# Patient Record
Sex: FEMALE | Race: BLACK OR AFRICAN AMERICAN | URBAN - METROPOLITAN AREA
[De-identification: names, ages, dates, MRNs, and addresses within clinical notes are randomized per-mention and may not be internally consistent; named-entity substitution may affect disease eponyms.]

---

## 2021-09-30 ENCOUNTER — OFFICE VISIT (OUTPATIENT)
Dept: FAMILY MEDICINE | Facility: CLINIC | Age: 26
End: 2021-09-30
Payer: MEDICAID

## 2021-09-30 VITALS
OXYGEN SATURATION: 100 % | HEART RATE: 66 BPM | WEIGHT: 127.4 LBS | DIASTOLIC BLOOD PRESSURE: 71 MMHG | SYSTOLIC BLOOD PRESSURE: 109 MMHG | RESPIRATION RATE: 16 BRPM | TEMPERATURE: 98.2 F | HEIGHT: 63 IN | BODY MASS INDEX: 22.57 KG/M2

## 2021-09-30 DIAGNOSIS — Z23 HIGH PRIORITY FOR 2019-NCOV VACCINE: ICD-10-CM

## 2021-09-30 DIAGNOSIS — Z02.89 REFUGEE HEALTH EXAMINATION: Primary | ICD-10-CM

## 2021-09-30 LAB
ALBUMIN SERPL-MCNC: 3.5 G/DL (ref 3.5–5)
ALP SERPL-CCNC: 47 U/L (ref 45–120)
ALT SERPL W P-5'-P-CCNC: 10 U/L (ref 0–45)
ANION GAP SERPL CALCULATED.3IONS-SCNC: 10 MMOL/L (ref 5–18)
AST SERPL W P-5'-P-CCNC: 17 U/L (ref 0–40)
BILIRUB SERPL-MCNC: 1.9 MG/DL (ref 0–1)
BUN SERPL-MCNC: 10 MG/DL (ref 8–22)
CALCIUM SERPL-MCNC: 9.2 MG/DL (ref 8.5–10.5)
CHLORIDE BLD-SCNC: 106 MMOL/L (ref 98–107)
CHOLEST SERPL-MCNC: 159 MG/DL
CO2 SERPL-SCNC: 23 MMOL/L (ref 22–31)
CREAT SERPL-MCNC: 0.7 MG/DL (ref 0.6–1.1)
FASTING STATUS PATIENT QL REPORTED: NORMAL
GFR SERPL CREATININE-BSD FRML MDRD: >90 ML/MIN/1.73M2
GLUCOSE BLD-MCNC: 82 MG/DL (ref 70–125)
HDLC SERPL-MCNC: 61 MG/DL
HIV 1+2 AB+HIV1 P24 AG SERPL QL IA: NEGATIVE
LDLC SERPL CALC-MCNC: 87 MG/DL
POTASSIUM BLD-SCNC: 4.1 MMOL/L (ref 3.5–5)
PROT SERPL-MCNC: 6.6 G/DL (ref 6–8)
SODIUM SERPL-SCNC: 139 MMOL/L (ref 136–145)
TRIGL SERPL-MCNC: 56 MG/DL

## 2021-09-30 PROCEDURE — 87491 CHLMYD TRACH DNA AMP PROBE: CPT | Performed by: STUDENT IN AN ORGANIZED HEALTH CARE EDUCATION/TRAINING PROGRAM

## 2021-09-30 PROCEDURE — 86787 VARICELLA-ZOSTER ANTIBODY: CPT | Performed by: STUDENT IN AN ORGANIZED HEALTH CARE EDUCATION/TRAINING PROGRAM

## 2021-09-30 PROCEDURE — 91301 COVID-19,PF,MODERNA (18+ YRS): CPT | Performed by: STUDENT IN AN ORGANIZED HEALTH CARE EDUCATION/TRAINING PROGRAM

## 2021-09-30 PROCEDURE — 87389 HIV-1 AG W/HIV-1&-2 AB AG IA: CPT | Performed by: STUDENT IN AN ORGANIZED HEALTH CARE EDUCATION/TRAINING PROGRAM

## 2021-09-30 PROCEDURE — 86708 HEPATITIS A ANTIBODY: CPT | Performed by: STUDENT IN AN ORGANIZED HEALTH CARE EDUCATION/TRAINING PROGRAM

## 2021-09-30 PROCEDURE — 86706 HEP B SURFACE ANTIBODY: CPT | Performed by: STUDENT IN AN ORGANIZED HEALTH CARE EDUCATION/TRAINING PROGRAM

## 2021-09-30 PROCEDURE — 99385 PREV VISIT NEW AGE 18-39: CPT | Mod: 25 | Performed by: STUDENT IN AN ORGANIZED HEALTH CARE EDUCATION/TRAINING PROGRAM

## 2021-09-30 PROCEDURE — 80053 COMPREHEN METABOLIC PANEL: CPT | Performed by: STUDENT IN AN ORGANIZED HEALTH CARE EDUCATION/TRAINING PROGRAM

## 2021-09-30 PROCEDURE — 0011A COVID-19,PF,MODERNA (18+ YRS): CPT | Performed by: STUDENT IN AN ORGANIZED HEALTH CARE EDUCATION/TRAINING PROGRAM

## 2021-09-30 PROCEDURE — 87340 HEPATITIS B SURFACE AG IA: CPT | Performed by: STUDENT IN AN ORGANIZED HEALTH CARE EDUCATION/TRAINING PROGRAM

## 2021-09-30 PROCEDURE — 86481 TB AG RESPONSE T-CELL SUSP: CPT | Performed by: STUDENT IN AN ORGANIZED HEALTH CARE EDUCATION/TRAINING PROGRAM

## 2021-09-30 PROCEDURE — 99000 SPECIMEN HANDLING OFFICE-LAB: CPT | Performed by: STUDENT IN AN ORGANIZED HEALTH CARE EDUCATION/TRAINING PROGRAM

## 2021-09-30 PROCEDURE — 86682 HELMINTH ANTIBODY: CPT | Mod: 90 | Performed by: STUDENT IN AN ORGANIZED HEALTH CARE EDUCATION/TRAINING PROGRAM

## 2021-09-30 PROCEDURE — 87591 N.GONORRHOEAE DNA AMP PROB: CPT | Performed by: STUDENT IN AN ORGANIZED HEALTH CARE EDUCATION/TRAINING PROGRAM

## 2021-09-30 PROCEDURE — 80061 LIPID PANEL: CPT | Performed by: STUDENT IN AN ORGANIZED HEALTH CARE EDUCATION/TRAINING PROGRAM

## 2021-09-30 PROCEDURE — 86803 HEPATITIS C AB TEST: CPT | Performed by: STUDENT IN AN ORGANIZED HEALTH CARE EDUCATION/TRAINING PROGRAM

## 2021-09-30 PROCEDURE — 36415 COLL VENOUS BLD VENIPUNCTURE: CPT | Performed by: STUDENT IN AN ORGANIZED HEALTH CARE EDUCATION/TRAINING PROGRAM

## 2021-09-30 PROCEDURE — 86780 TREPONEMA PALLIDUM: CPT | Performed by: STUDENT IN AN ORGANIZED HEALTH CARE EDUCATION/TRAINING PROGRAM

## 2021-09-30 PROCEDURE — 86704 HEP B CORE ANTIBODY TOTAL: CPT | Performed by: STUDENT IN AN ORGANIZED HEALTH CARE EDUCATION/TRAINING PROGRAM

## 2021-09-30 ASSESSMENT — MIFFLIN-ST. JEOR: SCORE: 1283.75

## 2021-09-30 NOTE — PROGRESS NOTES
Initial Refugee Screening Exam    PC staff should enter immunizations into the chart      used this visit: A [employer]  was used for this visit.     HEALTH HISTORY    Concerns today: none    Country of Origin:  [place]  Year left country of Origin: 2021  Other countries lived in and dates: [place] 2021  Date of Arrival in [place]: 8/5/2021  Is our listed age correct? Yes  Do you go by any other name?: No  Native Language: Cong  Family in [place]: Yes her mom and dad, aunt, 7 brothers, one sister (18 years old). She lives with 3 brothers.  Family in other countries:  Aunt in [place]    Pre-Departure Medical Screening Examination Reviewed:Yes  Class A conditions: No  Class B conditions: No  Presumptive treatment for intestinal parasites?: Yes - praziquantel 8/1/21, albendazole 8/3/21, artemether-lumefantrine 8/1-8/3/21  History of BCG vaccination: Unknown  Chronic or serious illness: No  Hospitalizations: No  Trauma: No    Family history, medication list, problem list and allergies were reviewed and updated as needed in Epic.    ROS:    C: NEGATIVE for fever, chills, change in weight  I: NEGATIVE for worrisome rashes, moles or lesions, spots without sensations (e.g. leprosy)  E: NEGATIVE for vision changes or irritation or red eyes  E/M: NEGATIVE for ear, mouth and throat problems. Does get some bleeding when she brushes her teeth  R: NEGATIVE for significant cough or SOB  CV: NEGATIVE for chest pain, palpitations or peripheral edema  GI: NEGATIVE for nausea, abdominal pain, heartburn, or change in bowel habits  : NEGATIVE for frequency, dysuria, or hematuria  M: NEGATIVE for significant arthralgias or myalgia  N: NEGATIVE for weakness, dizziness or paresthesias, headaches  E: NEGATIVE for temperature intolerance, skin/hair changes  H: NEGATIVE for bleeding problems  P: NEGATIVE for nightmares, no sleep problems, not easily saddened or angered    Mental Health:    1. In the past month, have you  "had many bad dreams or nightmares that remind you of   things that happened in your country or refugee camp? No  2. In the past month, have you felt very sad? No  3. In the past month, have you been thinking too much about the past (even if you did   not want to?) No  4. In the past month, have you avoided situations that remind you of the past? No  (Prompt: Do you turn off the radio or TV if the program is disturbing?)   5. Do any of these problems make it difficult to do what you need to do on a daily   basis?  (Prompt: Are you able to take care of yourself and your family?) No      EXAMINATION:  /71 (BP Location: Left arm, Patient Position: Sitting, Cuff Size: Adult Regular)   Pulse 66   Temp 98.2  F (36.8  C) (Oral)   Resp 16   Ht 1.595 m (5' 2.8\")   Wt 57.8 kg (127 lb 6.4 oz)   LMP 09/16/2021 (Exact Date)   SpO2 100%   Breastfeeding No   BMI 22.72 kg/m    GENERAL: healthy, alert, well nourished, well hydrated, no distress  HENT: ear canals- normal; TMs- normal; Nose- normal; Mouth- no ulcers, no lesions  NECK: no tenderness, no adenopathy, no asymmetry, no masses, no stiffness; thyroid- normal to palpation  RESP: lungs clear to auscultation - no rales, no rhonchi, no wheezes  CV: regular rates and rhythm, normal S1 S2, no S3 or S4 and no murmur, no click or rub -  ABDOMEN: soft, no tenderness, no  hepatosplenomegaly, no masses, normal bowel sounds    ASSESSMENT:    New Arrival Health Screening     PLAN:    1) Labs:    CMP  Lipid if age >18  CBC with diff  TB Quant if age>5  RPR  Strongyloides Ab  Schistosoma Ab  HIV  Heb B Core Ab  Hep B Surface Ab  Hep B Surface Ag  Hep C Ab  Hep A Ab  Varicella titer  Urine for GC/Chlamydia if pt of sexually active age  Urine Pregnancy Test if female of childbearing age       2) TB:    PPD if 6 months to under 2 years old, TB Quant if over 2 years old      3) Immunizations to be applied at second visit. Received first dose of COVID-19 vaccine today.       Total " of 40 minutes was spent in face to face contact with patient with > 50% in counseling and coordination of care.  Options for treatment and/or follow-up care were reviewed with the patient. Vane Serrano was engaged and actively involved in the decision making process. She verbalized understanding of the options discussed and was satisfied with the final plan.    RTC in 2-3 weeks for discussion of results, treatment (if necessary) and  devlopment of an ongoing plan for care    Nya Guevara MD, PGY-2  I precepted today with Dr. Sushila MD.

## 2021-09-30 NOTE — PROGRESS NOTES
Preceptor Attestation:   Patient seen, evaluated and discussed with the resident. I have verified the content of the note, which accurately reflects my assessment of the patient and the plan of care.   Supervising Physician:  Robin Conti MD

## 2021-09-30 NOTE — PATIENT INSTRUCTIONS
Plan:  1. It was a pleasure seeing you in clinic today.  2. We will discuss your lab results at your next visit.   3. I will see you at your appointment on 10/25/21.  4. You received your first dose of COVID vaccine today. You will get your second dose at your next visit.     [facility]  Phone: [phone]  Address: [address] [place] [address]

## 2021-10-01 LAB
C TRACH DNA SPEC QL NAA+PROBE: NEGATIVE
HAV IGG SER QL IA: POSITIVE
HBV CORE AB SERPL QL IA: NEGATIVE
HBV SURFACE AB SERPLBLD QL IA.RAPID: NEGATIVE
HBV SURFACE AG SERPL QL IA: NONREACTIVE
HCV AB SERPL QL IA: NEGATIVE
N GONORRHOEA DNA SPEC QL NAA+PROBE: NEGATIVE
T PALLIDUM AB SER QL: NEGATIVE
VZV IGG SER QL IA: NEGATIVE

## 2021-10-03 LAB — STRONGYLOIDES IGG SER IA-ACNC: 2 IV

## 2021-10-04 LAB
GAMMA INTERFERON BACKGROUND BLD IA-ACNC: 0.08 IU/ML
M TB IFN-G BLD-IMP: NEGATIVE
M TB IFN-G CD4+ BCKGRND COR BLD-ACNC: 9.92 IU/ML
MITOGEN IGNF BCKGRD COR BLD-ACNC: -0.02 IU/ML
MITOGEN IGNF BCKGRD COR BLD-ACNC: 0 IU/ML
QUANTIFERON MITOGEN: 10 IU/ML
QUANTIFERON NIL TUBE: 0.08 IU/ML
QUANTIFERON TB1 TUBE: 0.06 IU/ML
QUANTIFERON TB2 TUBE: 0.08

## 2021-10-05 LAB — SCHISTOSOMA IGG SER QL: NEGATIVE

## 2021-10-25 ENCOUNTER — OFFICE VISIT (OUTPATIENT)
Dept: FAMILY MEDICINE | Facility: CLINIC | Age: 26
End: 2021-10-25
Payer: COMMERCIAL

## 2021-10-25 VITALS
WEIGHT: 127.2 LBS | SYSTOLIC BLOOD PRESSURE: 113 MMHG | RESPIRATION RATE: 16 BRPM | TEMPERATURE: 97.5 F | DIASTOLIC BLOOD PRESSURE: 77 MMHG | BODY MASS INDEX: 22.68 KG/M2 | OXYGEN SATURATION: 100 % | HEART RATE: 66 BPM

## 2021-10-25 DIAGNOSIS — Z23 NEED FOR VACCINATION: ICD-10-CM

## 2021-10-25 DIAGNOSIS — Z02.89 REFUGEE HEALTH EXAMINATION: Primary | ICD-10-CM

## 2021-10-25 DIAGNOSIS — Z23 HIGH PRIORITY FOR 2019-NCOV VACCINE: ICD-10-CM

## 2021-10-25 PROCEDURE — 90746 HEPB VACCINE 3 DOSE ADULT IM: CPT | Performed by: STUDENT IN AN ORGANIZED HEALTH CARE EDUCATION/TRAINING PROGRAM

## 2021-10-25 PROCEDURE — 0012A COVID-19,PF,MODERNA (18+ YRS): CPT | Performed by: STUDENT IN AN ORGANIZED HEALTH CARE EDUCATION/TRAINING PROGRAM

## 2021-10-25 PROCEDURE — 90471 IMMUNIZATION ADMIN: CPT | Performed by: STUDENT IN AN ORGANIZED HEALTH CARE EDUCATION/TRAINING PROGRAM

## 2021-10-25 PROCEDURE — 90472 IMMUNIZATION ADMIN EACH ADD: CPT | Performed by: STUDENT IN AN ORGANIZED HEALTH CARE EDUCATION/TRAINING PROGRAM

## 2021-10-25 PROCEDURE — 99213 OFFICE O/P EST LOW 20 MIN: CPT | Mod: 25 | Performed by: STUDENT IN AN ORGANIZED HEALTH CARE EDUCATION/TRAINING PROGRAM

## 2021-10-25 PROCEDURE — 91301 COVID-19,PF,MODERNA (18+ YRS): CPT | Performed by: STUDENT IN AN ORGANIZED HEALTH CARE EDUCATION/TRAINING PROGRAM

## 2021-10-25 PROCEDURE — 90716 VAR VACCINE LIVE SUBQ: CPT | Performed by: STUDENT IN AN ORGANIZED HEALTH CARE EDUCATION/TRAINING PROGRAM

## 2021-10-25 NOTE — NURSING NOTE
Due to patient being non-English speaking/uses sign language, an  was used for this visit. Only for face-to-face interpretation by an external agency, date and length of interpretation can be found on the scanned worksheet.       name: Ricardo [id number]  Language: Cong   Agency:  [employer]  Type of interpretation:  TELEPHONE      JOVANA Souza  3:33 PM  10/25/2021

## 2021-10-25 NOTE — LETTER
RETURN TO WORK/SCHOOL FORM    10/25/2021    Re: Vane Serrano  1995      To Whom It May Concern:     Vane Serrano was seen in clinic today.  She may return to work without restrictions on 10/26/21.           Nya Guevara MD  10/25/2021 4:22 PM

## 2021-10-25 NOTE — PATIENT INSTRUCTIONS
Plan:  1. It was a pleasure seeing you in clinic today.  2. You received vaccines for chicken pox and hepatitis B today.  3. Come back in 2 weeks for the second COVID vaccine  4. Come back to see Dr. Guevara in one month.     [facility]  Phone: [phone]  Address: [address] [place] [address]

## 2021-10-25 NOTE — PROGRESS NOTES
Preceptor Attestation:    I discussed the patient with the resident and evaluated the patient in person. I have verified the content of the note, which accurately reflects my assessment of the patient and the plan of care.   Supervising Physician:  Seferino Paige MD.

## 2021-10-25 NOTE — PROGRESS NOTES
REFUGEE SCREENING: SECOND VISIT    Subjective: Doing well    Labs from Initial Refugee Screening Visit were reviewed       Office Visit on 09/30/2021   Component Date Value Ref Range Status     Neisseria gonorrhoeae 09/30/2021 Negative  Negative Final    Negative for N. gonorrhoeae rRNA by transcription mediated amplification. A negative result by transcription mediated amplification does not preclude the presence of C. trachomatis infection because results are dependent on proper and adequate collection, absence of inhibitors and sufficient rRNA to be detected.     Chlamydia trachomatis 09/30/2021 Negative  Negative Final    A negative result by transcription mediated amplification does not preclude the presence of C. trachomatis infection because results are dependent on proper and adequate collection, absence of inhibitors and sufficient rRNA to be detected.     Sodium 09/30/2021 139  136 - 145 mmol/L Final     Potassium 09/30/2021 4.1  3.5 - 5.0 mmol/L Final     Chloride 09/30/2021 106  98 - 107 mmol/L Final     Carbon Dioxide (CO2) 09/30/2021 23  22 - 31 mmol/L Final     Anion Gap 09/30/2021 10  5 - 18 mmol/L Final     Urea Nitrogen 09/30/2021 10  8 - 22 mg/dL Final     Creatinine 09/30/2021 0.70  0.60 - 1.10 mg/dL Final     Calcium 09/30/2021 9.2  8.5 - 10.5 mg/dL Final     Glucose 09/30/2021 82  70 - 125 mg/dL Final     Alkaline Phosphatase 09/30/2021 47  45 - 120 U/L Final     AST 09/30/2021 17  0 - 40 U/L Final     ALT 09/30/2021 10  0 - 45 U/L Final     Protein Total 09/30/2021 6.6  6.0 - 8.0 g/dL Final     Albumin 09/30/2021 3.5  3.5 - 5.0 g/dL Final     Bilirubin Total 09/30/2021 1.9* 0.0 - 1.0 mg/dL Final     GFR Estimate 09/30/2021 >90  >60 mL/min/1.73m2 Final    As of July 11, 2021, eGFR is calculated by the CKD-EPI creatinine equation, without race adjustment. eGFR can be influenced by muscle mass, exercise, and diet. The reported eGFR is an estimation only and is only applicable if the renal  function is stable.     Cholesterol 09/30/2021 159  <=199 mg/dL Final     Triglycerides 09/30/2021 56  <=149 mg/dL Final     Direct Measure HDL 09/30/2021 61  >=50 mg/dL Final    HDL Cholesterol Reference Range:     0-2 years:   No reference ranges established for patients under 2 years old  at [facility] for lipid analytes.    2-8 years:  Greater than 45 mg/dL     18 years and older:   Female: Greater than or equal to 50 mg/dL   Male:   Greater than or equal to 40 mg/dL     LDL Cholesterol Calculated 09/30/2021 87  <=129 mg/dL Final     Patient Fasting > 8hrs? 09/30/2021 Unknown   Final     Treponema Antibody Total 09/30/2021 Negative  Negative Final     Strongyloides Vicki IgG 09/30/2021 2.0* <=0.9 IV Final    INTERPRETIVE INFORMATION: Strongyloides Ab, IgG by CULLEN      0.9 IV or less....... Negative - No significant                          level of Strongyloides IgG                          antibody detected.       1.0 IV................Equivocal - The Strongyloides IgG                           antibody result is borderline and                           therefore inconclusive. Recommend                           retesting the patient in 2-4 weeks,                          if clinically indicated.      1.1 IV or greater ... Positive - IgG antibodies to                          Strongyloides detected, which                          may suggest current or past                          infection.    False-positive results may occur with prior exposure to   other helminth infections. Testing low-prevalence   populations may also result in false-positive results.     Schistosoma Ab IgG 09/30/2021 Negative  Negative Final     HIV Antigen Antibody Combo 09/30/2021 Negative  Negative Final     Hepatitis A Antibody IgG 09/30/2021 Positive* Negative Final     Hepatitis B Core Antibody Total 09/30/2021 Negative  Negative Final     Hepatitis B Surface Antibody 09/30/2021 Negative  Negative Final     Hepatitis C  Antibody 09/30/2021 Negative  Negative Final     Varicella Zoster Antibody IgG 09/30/2021 Negative   Final     Hepatitis B Surface Antigen 09/30/2021 Nonreactive  Nonreactive Final     Quantiferon Nil Tube 09/30/2021 0.08  IU/mL Final     Quantiferon TB1 Tube 09/30/2021 0.06  IU/mL Final     Quantiferon TB2 Tube 09/30/2021 0.08   Final     Quantiferon Mitogen 09/30/2021 10.00  IU/mL Final     Quantiferon-TB Gold Plus 09/30/2021 Negative  Negative Final    No interferon gamma response to M.tuberculosis antigens was detected. Infection with M.tuberculosis is unlikely, however a single negative result does not exclude infection. In patients at high risk for infection, a second test should be considered in accordance with the 2017 ATS/IDSA/CDC Clinical Pract  ice Guidelines for Diagnosis of Tuberculosis in Adults and Children      TB1 Ag minus Nil Value 09/30/2021 -0.02  IU/mL Final     TB2 Ag minus Nil Value 09/30/2021 0.00  IU/mL Final     Mitogen minus Nil Result 09/30/2021 9.92  IU/mL Final     Nil Result 09/30/2021 0.08  IU/mL Final        ROS:  General: No fevers, sleeping well at night  Head: No headache  Neck: No swallowing problems   CV: No chest pain or palpitations  Resp: No shortness of breath.  No cough.  GI: No constipation, or diarrhea, no nausea or vomiting  : No urinary c/o    Objective:  /77 (BP Location: Right arm, Patient Position: Sitting, Cuff Size: Adult Regular)   Pulse 66   Temp 97.5  F (36.4  C) (Oral)   Resp 16   Wt 57.7 kg (127 lb 3.2 oz)   SpO2 100%   BMI 22.68 kg/m    Gen:  Well nourished and in NAD  HEENT: nasopharynx pink and moist; oropharynx pink and moist  Neck: supple without lymphadenopathy  CV:  RRR  - no murmurs, rubs, or gallups,   Pulm:  CTAB, no wheezes/rales/rhonchi, good air entry   ABD: soft, nontender  Extrem: no cyanosis, edema or clubbing;   Psych: Euthymic     Assessment/Plan:  Refugee health examination    1)Abnormal Lab Results:  Hep A antibody positive  indicating immunity   Bili 1.9 without other elevations in LFTs  Negative hep B surface antibody- Needs hep B series  Strongyloides antibody positive, but was treated prophylactically with praziquantel 8/1/21 and albendazole 8/3/21  Varicella aicha negative- needs varicella vaccine    2) TB:   TB Quant result: negative 9/30/21    3)Immunizations:  Hep B  Varicella  2nd Moderna COVID    4)Referrals:  No     5)Follow up Plan:   Return to clinic for follow up in 4 weeks    We discussed having a visit with a dentist to establish regular dental care: no  We discussed yearly visits with a primary care physician for preventative health care: yes      Diagnosis or treatment significantly limited by social determinants of health - limited income, low health literacy, language barrier    I precepted today with Dr. Paige.     Nya Guevara MD, PGY-2

## 2021-11-29 ENCOUNTER — OFFICE VISIT (OUTPATIENT)
Dept: FAMILY MEDICINE | Facility: CLINIC | Age: 26
End: 2021-11-29
Payer: COMMERCIAL

## 2021-11-29 VITALS
TEMPERATURE: 96.6 F | HEART RATE: 59 BPM | WEIGHT: 125.4 LBS | SYSTOLIC BLOOD PRESSURE: 113 MMHG | DIASTOLIC BLOOD PRESSURE: 74 MMHG | BODY MASS INDEX: 22.36 KG/M2 | OXYGEN SATURATION: 100 % | RESPIRATION RATE: 16 BRPM

## 2021-11-29 DIAGNOSIS — Z00.00 ROUTINE GENERAL MEDICAL EXAMINATION AT A HEALTH CARE FACILITY: Primary | ICD-10-CM

## 2021-11-29 LAB
CLUE CELLS: PRESENT
TRICHOMONAS, WET PREP: ABNORMAL
WBC'S/HIGH POWER FIELD, WET PREP: ABNORMAL
YEAST, WET PREP: ABNORMAL

## 2021-11-29 PROCEDURE — 90686 IIV4 VACC NO PRSV 0.5 ML IM: CPT | Performed by: STUDENT IN AN ORGANIZED HEALTH CARE EDUCATION/TRAINING PROGRAM

## 2021-11-29 PROCEDURE — 99395 PREV VISIT EST AGE 18-39: CPT | Mod: 25 | Performed by: STUDENT IN AN ORGANIZED HEALTH CARE EDUCATION/TRAINING PROGRAM

## 2021-11-29 PROCEDURE — 90715 TDAP VACCINE 7 YRS/> IM: CPT | Performed by: STUDENT IN AN ORGANIZED HEALTH CARE EDUCATION/TRAINING PROGRAM

## 2021-11-29 PROCEDURE — 90472 IMMUNIZATION ADMIN EACH ADD: CPT | Performed by: STUDENT IN AN ORGANIZED HEALTH CARE EDUCATION/TRAINING PROGRAM

## 2021-11-29 PROCEDURE — 90471 IMMUNIZATION ADMIN: CPT | Performed by: STUDENT IN AN ORGANIZED HEALTH CARE EDUCATION/TRAINING PROGRAM

## 2021-11-29 PROCEDURE — 90746 HEPB VACCINE 3 DOSE ADULT IM: CPT | Performed by: STUDENT IN AN ORGANIZED HEALTH CARE EDUCATION/TRAINING PROGRAM

## 2021-11-29 PROCEDURE — 90651 9VHPV VACCINE 2/3 DOSE IM: CPT | Performed by: STUDENT IN AN ORGANIZED HEALTH CARE EDUCATION/TRAINING PROGRAM

## 2021-11-29 PROCEDURE — G0123 SCREEN CERV/VAG THIN LAYER: HCPCS | Performed by: STUDENT IN AN ORGANIZED HEALTH CARE EDUCATION/TRAINING PROGRAM

## 2021-11-29 PROCEDURE — 87210 SMEAR WET MOUNT SALINE/INK: CPT | Performed by: STUDENT IN AN ORGANIZED HEALTH CARE EDUCATION/TRAINING PROGRAM

## 2021-11-29 NOTE — PROGRESS NOTES
Preceptor Attestation:   Patient seen, evaluated and discussed with the resident. I have verified the content of the note, which accurately reflects my assessment of the patient and the plan of care.   Supervising Physician:  Yancy High MD

## 2021-11-29 NOTE — PROGRESS NOTES
{PROVIDER CHARTING PREFERENCE:308985}    Patient was discussed with attending physician,  {BTPRECEPTORS:[id number]}, who agrees with the assessment and plan.    Nya Guevara MD, PGY-2  [facility]  11/29/2021      Subjective   Vane Serrano is a 26 year old female who presents for the following health issues {ACCOMPANIED BY STATEMENT (Optional):882629}    No chief complaint on file.      ***    Review of Systems   {ROS COMP (Optional):940231}      Objective    There were no vitals filed for this visit.  There is no height or weight on file to calculate BMI.  Physical Exam   General: alert, appears comfortable, no acute distress  HEENT: atraumatic, conjunctiva clear without erythema, EOM's intact, no nasal discharge, MMM  Neck: supple  Cardiac: normal rate and rhythm with no murmurs or extra sounds  Resp: lungs clear to auscultation bilaterally with no crackles or wheezes, no increased work of breathing  Abdomen: soft, non-tender to palpation, no masses  Extremities: no peripheral edema  Skin: no rashes or suspicious legions on exposed skin  Neuro: CN's grossly intact  Psych: affect congruent with mood    {Diagnostic Test Results (Optional):386765}    {AMBULATORY ATTESTATION (Optional):891271}

## 2021-11-29 NOTE — PROGRESS NOTES
Female Physical Note    Concerns today: No special concerns today. Having some bleeding when she brushes her teeth.     A [employer]  was used for  this visit.     ROS:  CONSTITUTIONAL: no fatigue, no unexpected change in weight  SKIN: no worrisome rashes, no worrisome moles, no worrisome lesions  EYES: no acute vision problems or changes  ENT: no ear problems, no mouth problems, no throat problems  RESP: no significant cough, no shortness of breath  BREAST: no masses, no tenderness, no discharge  CV: no chest pain, no palpitations, no new or worsening peripheral edema  GI: no nausea, no vomiting, no constipation, no diarrhea  : no frequency, no dysuria, no hematuria  MS: no claudication, no myalgias, no joint aches  NEURO: no weakness, no dizziness, no syncope, no headaches  ENDOCRINE: no temperature intolerance, no skin/hair changes  HEME/ALLERGY/IMMUNE: positive for bleeding gums with teeth brushing  PSYCHIATRIC: NEGATIVE for changes in mood or affect    Sexually Active: No  Sexual concerns: No   Contraception:not needed   P:0  Menarche: possibly 16 Patient's last menstrual period was 2021 (exact date).   STD History: Neg  Last Pap Smear Date: none   Abnormal Pap History: None    There is no problem list on file for this patient.  No major medical problems    No current outpatient medications on file.       No past medical history on file.     Family History     Problem (# of Occurrences) Relation (Name,Age of Onset)    No Known Problems (4) Mother, Father, Sister, Brother       Negative family history of: Diabetes, Hypertension, Coronary Artery Disease          Problem List Medication List and Allergy List were reviewed.    Patient is an established patient of this clinic..    Social History     Tobacco Use     Smoking status: Never Smoker     Smokeless tobacco: Never Used   Substance Use Topics     Alcohol use: Never     Single  Children ? no    Has anyone hurt you physically, for example  by pushing, hitting, slapping or kicking you or forcing you to have sex? Denies  Do you feel threatened or controlled by a partner, ex-partner or anyone in your life? Denies    RISK BEHAVIORS AND HEALTHY HABITS:  Tobacco Use/Smoking: None  Illicit Drug Use: None  Do you use alcohol? No  Diet (5-7 servings of fruits/veg daily): Yes   Exercise (30 min accumulated most days):Yes  Dental Care: No   Calcium 1500 mg/d:  Yes  Seat Belt Use: Yes     Pap every 3 years for women 21-29. Recommended and patient accepted testing. and HIV screening:  Date done 9/30/21  Result(s) negative    Immunization History   Administered Date(s) Administered     COVID-19,PF,Moderna 09/30/2021, 10/25/2021     HepB, Unspecified 08/22/2018, 10/04/2018, 03/14/2019     HepB-Adult 10/25/2021     MMR 08/22/2018, 10/04/2018     Td (Adult), Adsorbed 08/22/2018, 10/04/2018     Varicella 10/25/2021    Reviewed Immunization Record Today    EXAMINATION:   /74 (BP Location: Left arm, Patient Position: Sitting, Cuff Size: Adult Regular)   Pulse 59   Temp (!) 96.6  F (35.9  C) (Tympanic)   Resp 16   Wt 56.9 kg (125 lb 6.4 oz)   LMP 11/16/2021 (Exact Date)   SpO2 100%   Breastfeeding No   BMI 22.36 kg/m    GENERAL: healthy, alert and no distress  EYES: Eyes grossly normal to inspection, extraocular movements - intact, and PERRL  HENT: ear canals- normal; TMs- normal; Nose- normal; Mouth- no ulcers, no lesions. Bilateral upper posterior gums appear erythematous without lesions or signs of active bleeding  NECK: no tenderness, no adenopathy, no asymmetry, no masses, no stiffness; thyroid- normal to palpation  RESP: lungs clear to auscultation - no rales, no rhonchi, no wheezes  BREAST: declined by patient, no concerns  CV: regular rates and rhythm, normal S1 S2, no S3 or S4 and no murmur, no click or rub -  ABDOMEN: soft, no tenderness, no  hepatosplenomegaly, no masses, normal bowel sounds  MS: extremities- no gross deformities noted, no  edema  SKIN: no suspicious lesions, no rashes  NEURO: strength and tone- normal, sensory exam- grossly normal, mentation- intact, speech- normal, reflexes- symmetric  BACK: no CVA tenderness, no paralumbar tenderness  - female: cervix- normal, adnexae- normal; uterus- normal, no masses, no discharge  RECTAL- female: no masses, no hemorrhoids  PSYCH: Alert and oriented times 3; speech- coherent , normal rate and volume; able to articulate logical thoughts, able to abstract reason, no tangential thoughts, no hallucinations or delusions, affect- normal  LYMPHATICS: ant. cervical- normal, post. cervical- normal, supraclavicular- normal    ASSESSMENT:  1. Health Care Maintenance: Normal Physical Exam    PLAN:  1. Up to date for labs from recent refugee screenings  2. Pap smear done today. Will call with results  3. Recommended mouthwash and dental visit for bleeding gums. List of dental providers who accept MA provided. Patient indicated understanding.  4. Patient declined contraception  5. Discussed recommended vaccinations. Patient would benefit from HPV vaccination series as she is not yet sexually active. Also due for Tdap and second hepatitis B. Influenza, hepatitis B, Tdap, and HPV vaccines done today.  6. Recommended follow up in 1 year or sooner for any concerns or questions.    I precepted today with Dr. Yancy High.    Nya Guevara MD, PGY-2  [facility]  November 29, 2021

## 2021-11-30 DIAGNOSIS — N76.0 BACTERIAL VAGINOSIS: Primary | ICD-10-CM

## 2021-11-30 DIAGNOSIS — B96.89 BACTERIAL VAGINOSIS: Primary | ICD-10-CM

## 2021-11-30 LAB
BKR LAB AP GYN ADEQUACY: NORMAL
BKR LAB AP GYN INTERPRETATION: NORMAL
BKR LAB AP HPV REFLEX: NO
BKR LAB AP LMP: NORMAL
BKR LAB AP PREVIOUS ABNORMAL: NORMAL
PATH REPORT.COMMENTS IMP SPEC: NORMAL
PATH REPORT.COMMENTS IMP SPEC: NORMAL
PATH REPORT.RELEVANT HX SPEC: NORMAL

## 2021-11-30 RX ORDER — METRONIDAZOLE 500 MG/1
500 TABLET ORAL 2 TIMES DAILY
Qty: 14 TABLET | Refills: 0 | Status: SHIPPED | OUTPATIENT
Start: 2021-11-30 | End: 2021-12-07

## 2021-12-13 ENCOUNTER — OFFICE VISIT (OUTPATIENT)
Dept: FAMILY MEDICINE | Facility: CLINIC | Age: 26
End: 2021-12-13
Payer: COMMERCIAL

## 2021-12-13 VITALS
HEART RATE: 52 BPM | TEMPERATURE: 97.4 F | OXYGEN SATURATION: 100 % | DIASTOLIC BLOOD PRESSURE: 78 MMHG | RESPIRATION RATE: 16 BRPM | SYSTOLIC BLOOD PRESSURE: 112 MMHG

## 2021-12-13 DIAGNOSIS — N76.0 BACTERIAL VAGINOSIS: Primary | ICD-10-CM

## 2021-12-13 DIAGNOSIS — B96.89 BACTERIAL VAGINOSIS: Primary | ICD-10-CM

## 2021-12-13 LAB
CLUE CELLS: ABNORMAL
TRICHOMONAS, WET PREP: ABNORMAL
WBC'S/HIGH POWER FIELD, WET PREP: ABNORMAL
YEAST, WET PREP: ABNORMAL

## 2021-12-13 PROCEDURE — 99213 OFFICE O/P EST LOW 20 MIN: CPT | Mod: GC | Performed by: STUDENT IN AN ORGANIZED HEALTH CARE EDUCATION/TRAINING PROGRAM

## 2021-12-13 PROCEDURE — 87210 SMEAR WET MOUNT SALINE/INK: CPT | Performed by: STUDENT IN AN ORGANIZED HEALTH CARE EDUCATION/TRAINING PROGRAM

## 2021-12-13 NOTE — PROGRESS NOTES
Assessment & Plan     Bacterial vaginosis  Maxwell is a 26-year-old woman presenting for follow-up of bacterial vaginosis infection, found on 11/29/2021 with routine exam showing increased discharge.  Treated with metronidazole.  Patient completed course.  Patient denies any symptoms.  Patient nervous and requests retest.  Repeat lab prep negative for Trichomonas, yeast, and clue cells.  Discussed results with patient.  Advised avoiding soaps or douches for cleaning.  - Wet preparation    Diagnosis or treatment significantly limited by social determinants of health - Limited income, low health literacy, language barrier    Patient was discussed with attending physician, Dr. Avila Royal MD, who agrees with the assessment and plan.    Nya Guevara MD, PGY-2  [facility]  12/13/2021      Subjective   Vane Serrano is a 26 year old female who presents for the following health issues     Chief Complaint   Patient presents with     Follow Up     Pt is here to follow up on her BV results.      Vane completed metronidazole treatment.  She denies any continued discharge or discomfort.  She requests retest as she did not have any symptoms when she tested positive for bacterial vaginosis.      Objective    Vitals:    12/13/21 0820   BP: 112/78   BP Location: Left arm   Patient Position: Sitting   Cuff Size: Adult Regular   Pulse: 52   Resp: 16   Temp: 97.4  F (36.3  C)   TempSrc: Oral   SpO2: 100%     There is no height or weight on file to calculate BMI.  Physical Exam   General: alert, appears comfortable, no acute distress  HEENT: atraumatic, conjunctiva clear without erythema, EOM's intact, mask in place  Neck: supple  Cardiac: normal rate, appears well perfused  Resp: Breathing comfortably on room air, no increased work of breathing  Gyn: Normal external female anatomy, normal pink vaginal mucosa, physiologic appearing discharge  Extremities: no peripheral edema  Skin: no rashes or  suspicious legions on exposed skin  Neuro: CN's grossly intact  Psych: affect congruent with mood    Results for orders placed or performed in visit on 12/13/21 (from the past 24 hour(s))   Wet preparation    Specimen: Vagina; Swab   Result Value Ref Range    Trichomonas Absent Absent    Yeast Absent Absent    Clue Cells Absent Absent    WBCs/high power field 2+ (A) None

## 2021-12-13 NOTE — PROGRESS NOTES
Preceptor Attestation:    I discussed the patient with the resident and evaluated the patient in person. I have verified the content of the note, which accurately reflects my assessment of the patient and the plan of care.   Supervising Physician:  Avila Royal MD.